# Patient Record
Sex: FEMALE | Race: WHITE | NOT HISPANIC OR LATINO | URBAN - METROPOLITAN AREA
[De-identification: names, ages, dates, MRNs, and addresses within clinical notes are randomized per-mention and may not be internally consistent; named-entity substitution may affect disease eponyms.]

---

## 2017-10-26 ENCOUNTER — IMPORTED ENCOUNTER (OUTPATIENT)
Dept: URBAN - METROPOLITAN AREA CLINIC 50 | Facility: CLINIC | Age: 72
End: 2017-10-26

## 2017-11-14 ENCOUNTER — IMPORTED ENCOUNTER (OUTPATIENT)
Dept: URBAN - METROPOLITAN AREA CLINIC 50 | Facility: CLINIC | Age: 72
End: 2017-11-14

## 2017-12-15 ENCOUNTER — IMPORTED ENCOUNTER (OUTPATIENT)
Dept: URBAN - METROPOLITAN AREA CLINIC 50 | Facility: CLINIC | Age: 72
End: 2017-12-15

## 2018-03-30 ENCOUNTER — IMPORTED ENCOUNTER (OUTPATIENT)
Dept: URBAN - METROPOLITAN AREA CLINIC 50 | Facility: CLINIC | Age: 73
End: 2018-03-30

## 2018-03-30 NOTE — PATIENT DISCUSSION
"""D/w patient using art tears more often and before the problem starts."" ""Start Artificial tears both eyes two - four times a day.  """

## 2019-04-05 ENCOUNTER — IMPORTED ENCOUNTER (OUTPATIENT)
Dept: URBAN - METROPOLITAN AREA CLINIC 50 | Facility: CLINIC | Age: 74
End: 2019-04-05

## 2020-06-12 ENCOUNTER — IMPORTED ENCOUNTER (OUTPATIENT)
Dept: URBAN - METROPOLITAN AREA CLINIC 50 | Facility: CLINIC | Age: 75
End: 2020-06-12

## 2020-06-12 NOTE — PATIENT DISCUSSION
"""Discussed dermatochalasis symptoms with patient. Discussed possible blepharoplasty if vision affected in the future. """

## 2020-06-12 NOTE — PATIENT DISCUSSION
"""Patient c/o she has to hold lids up to read."" ""Discussed dermatochalasis symptoms with patient. Discussed possible blepharoplasty if vision affected in the future. """

## 2020-06-26 ENCOUNTER — IMPORTED ENCOUNTER (OUTPATIENT)
Dept: URBAN - METROPOLITAN AREA CLINIC 50 | Facility: CLINIC | Age: 75
End: 2020-06-26

## 2021-04-18 ASSESSMENT — TONOMETRY
OS_IOP_MMHG: 16
OS_IOP_MMHG: 13
OS_IOP_MMHG: 14
OS_IOP_MMHG: 11
OD_IOP_MMHG: 15
OD_IOP_MMHG: 12
OD_IOP_MMHG: 10
OD_IOP_MMHG: 14
OD_IOP_MMHG: 15
OS_IOP_MMHG: 16

## 2021-04-18 ASSESSMENT — VISUAL ACUITY
OD_OTHER: >20/400.
OD_CC: J1+@ 16 IN
OD_CC: 20/20-2
OD_CC: 20/25
OS_BAT: 20/25
OD_OTHER: 20/30. 20/40.
OS_CC: 20/25
OD_BAT: >20/400
OS_CC: 20/30
OD_CC: 20/25
OS_CC: 20/20
OS_CC: 20/25
OS_OTHER: 20/50-. 20/80-.
OS_CC: J1+
OD_CC: J1+
OS_OTHER: 20/25. 20/30.
OS_CC: J1+@ 16 IN
OD_CC: J1@ 16 IN
OS_CC: J1@ 16 IN
OD_CC: 20/40+2
OD_BAT: 20/30
OS_CC: 20/25-
OD_CC: 20/30+1
OS_BAT: 20/50-

## 2021-06-16 ENCOUNTER — PREPPED CHART (OUTPATIENT)
Dept: URBAN - METROPOLITAN AREA CLINIC 52 | Facility: CLINIC | Age: 76
End: 2021-06-16

## 2021-06-18 ENCOUNTER — COMPREHENSIVE EXAM (OUTPATIENT)
Dept: URBAN - METROPOLITAN AREA CLINIC 52 | Facility: CLINIC | Age: 76
End: 2021-06-18

## 2021-06-18 DIAGNOSIS — H35.371: ICD-10-CM

## 2021-06-18 DIAGNOSIS — H25.13: ICD-10-CM

## 2021-06-18 DIAGNOSIS — H43.813: ICD-10-CM

## 2021-06-18 PROCEDURE — 92134 CPTRZ OPH DX IMG PST SGM RTA: CPT

## 2021-06-18 PROCEDURE — 92014 COMPRE OPH EXAM EST PT 1/>: CPT

## 2021-06-18 ASSESSMENT — VISUAL ACUITY
OS_GLARE: 20/80
OD_GLARE: 20/200
OD_CC: 20/30-1
OS_CC: 20/20-1
OD_GLARE: 20/50
OS_GLARE: 20/40
OU_CC: J1+

## 2021-06-18 ASSESSMENT — TONOMETRY
OD_IOP_MMHG: 14
OS_IOP_MMHG: 14

## 2022-06-17 ENCOUNTER — COMPREHENSIVE EXAM (OUTPATIENT)
Dept: URBAN - METROPOLITAN AREA CLINIC 52 | Facility: CLINIC | Age: 77
End: 2022-06-17

## 2022-06-17 DIAGNOSIS — H35.373: ICD-10-CM

## 2022-06-17 DIAGNOSIS — H25.13: ICD-10-CM

## 2022-06-17 DIAGNOSIS — H43.813: ICD-10-CM

## 2022-06-17 PROCEDURE — 92014 COMPRE OPH EXAM EST PT 1/>: CPT

## 2022-06-17 PROCEDURE — 92134 CPTRZ OPH DX IMG PST SGM RTA: CPT

## 2022-06-17 PROCEDURE — 92015 DETERMINE REFRACTIVE STATE: CPT

## 2022-06-17 ASSESSMENT — TONOMETRY
OS_IOP_MMHG: 15
OD_IOP_MMHG: 16

## 2022-06-17 ASSESSMENT — VISUAL ACUITY
OU_CC: J1+
OD_CC: 20/30-2
OS_GLARE: 20/40-2
OD_GLARE: 20/40
OS_GLARE: 20/40
OS_CC: 20/30
OD_GLARE: 20/60

## 2023-06-23 ENCOUNTER — COMPREHENSIVE EXAM (OUTPATIENT)
Dept: URBAN - METROPOLITAN AREA CLINIC 52 | Facility: CLINIC | Age: 78
End: 2023-06-23

## 2023-06-23 DIAGNOSIS — H43.813: ICD-10-CM

## 2023-06-23 DIAGNOSIS — H35.361: ICD-10-CM

## 2023-06-23 DIAGNOSIS — H35.373: ICD-10-CM

## 2023-06-23 DIAGNOSIS — H25.13: ICD-10-CM

## 2023-06-23 PROCEDURE — 92134 CPTRZ OPH DX IMG PST SGM RTA: CPT

## 2023-06-23 PROCEDURE — 92014 COMPRE OPH EXAM EST PT 1/>: CPT

## 2023-06-23 ASSESSMENT — VISUAL ACUITY
OS_GLARE: 20/40
OU_CC: J1+ @16IN
OD_GLARE: 20/40
OD_GLARE: 20/50
OS_GLARE: 20/50
OS_CC: 20/25-1
OD_CC: 20/25

## 2023-06-23 ASSESSMENT — TONOMETRY
OS_IOP_MMHG: 15
OD_IOP_MMHG: 15

## 2024-08-02 ENCOUNTER — COMPREHENSIVE EXAM (OUTPATIENT)
Dept: URBAN - METROPOLITAN AREA CLINIC 52 | Facility: CLINIC | Age: 79
End: 2024-08-02

## 2024-08-02 DIAGNOSIS — H25.13: ICD-10-CM

## 2024-08-02 DIAGNOSIS — H52.4: ICD-10-CM

## 2024-08-02 DIAGNOSIS — H35.373: ICD-10-CM

## 2024-08-02 DIAGNOSIS — H02.834: ICD-10-CM

## 2024-08-02 DIAGNOSIS — H35.361: ICD-10-CM

## 2024-08-02 DIAGNOSIS — H43.813: ICD-10-CM

## 2024-08-02 DIAGNOSIS — H02.831: ICD-10-CM

## 2024-08-02 PROCEDURE — 99214 OFFICE O/P EST MOD 30 MIN: CPT

## 2024-08-02 PROCEDURE — 92015 DETERMINE REFRACTIVE STATE: CPT

## 2024-08-02 PROCEDURE — 92134 CPTRZ OPH DX IMG PST SGM RTA: CPT

## 2024-08-02 ASSESSMENT — VISUAL ACUITY
OD_GLARE: 20/40+1
OS_GLARE: 20/20
OS_CC: 20/20-2
OD_GLARE: 20/30-2
OS_GLARE: 20/25
OD_CC: 20/30-2
OU_CC: J1+@14"

## 2024-08-02 ASSESSMENT — TONOMETRY
OS_IOP_MMHG: 15
OD_IOP_MMHG: 15

## 2024-09-05 ENCOUNTER — PRE-OP/H&P (OUTPATIENT)
Dept: URBAN - METROPOLITAN AREA CLINIC 52 | Facility: CLINIC | Age: 79
End: 2024-09-05

## 2024-09-05 DIAGNOSIS — H25.13: ICD-10-CM

## 2024-09-05 PROCEDURE — 92136 OPHTHALMIC BIOMETRY: CPT

## 2024-09-05 PROCEDURE — 99214 OFFICE O/P EST MOD 30 MIN: CPT

## 2024-09-05 PROCEDURE — 92025IOL CORNEAL TOPOGRAPHY PREMIUM IOL

## 2024-09-05 PROCEDURE — 92134 CPTRZ OPH DX IMG PST SGM RTA: CPT | Mod: NC

## 2024-09-11 ENCOUNTER — SURGERY/PROCEDURE (OUTPATIENT)
Dept: URBAN - METROPOLITAN AREA SURGERY 16 | Facility: SURGERY | Age: 79
End: 2024-09-11

## 2024-09-11 DIAGNOSIS — H25.13: ICD-10-CM

## 2024-09-11 PROCEDURE — 99199PCV CUSTOM VISION

## 2024-09-11 PROCEDURE — 66984CV REMOVE CATARACT, INSERT LENS, CUSTOM VISION

## 2024-09-12 ENCOUNTER — POST-OP (OUTPATIENT)
Dept: URBAN - METROPOLITAN AREA CLINIC 52 | Facility: CLINIC | Age: 79
End: 2024-09-12

## 2024-09-12 DIAGNOSIS — Z98.41: ICD-10-CM

## 2024-09-12 DIAGNOSIS — Z96.1: ICD-10-CM

## 2024-09-24 ENCOUNTER — POST-OP (OUTPATIENT)
Dept: URBAN - METROPOLITAN AREA CLINIC 50 | Facility: LOCATION | Age: 79
End: 2024-09-24

## 2024-09-24 DIAGNOSIS — H43.813: ICD-10-CM

## 2024-09-24 DIAGNOSIS — Z98.41: ICD-10-CM

## 2024-09-24 DIAGNOSIS — Z96.1: ICD-10-CM

## 2024-09-24 PROCEDURE — 92134 CPTRZ OPH DX IMG PST SGM RTA: CPT | Mod: NC

## 2024-10-02 ENCOUNTER — SURGERY/PROCEDURE (OUTPATIENT)
Dept: URBAN - METROPOLITAN AREA SURGERY 16 | Facility: SURGERY | Age: 79
End: 2024-10-02

## 2024-10-02 DIAGNOSIS — H25.12: ICD-10-CM

## 2024-10-02 PROCEDURE — 68841 INSJ RX ELUT IMPLT LAC CANAL: CPT | Mod: 51,LT,79,LT

## 2024-10-02 PROCEDURE — 99199PCV CUSTOM VISION

## 2024-10-02 PROCEDURE — 66984CV REMOVE CATARACT, INSERT LENS, CUSTOM VISION: Mod: 79,LT

## 2024-10-03 ENCOUNTER — POST-OP (OUTPATIENT)
Dept: URBAN - METROPOLITAN AREA CLINIC 52 | Facility: CLINIC | Age: 79
End: 2024-10-03

## 2024-10-03 DIAGNOSIS — Z96.1: ICD-10-CM

## 2024-10-03 DIAGNOSIS — Z98.42: ICD-10-CM

## 2024-10-03 PROCEDURE — 99024 POSTOP FOLLOW-UP VISIT: CPT

## 2024-10-31 ENCOUNTER — POST-OP (OUTPATIENT)
Dept: URBAN - METROPOLITAN AREA CLINIC 52 | Facility: CLINIC | Age: 79
End: 2024-10-31

## 2024-10-31 DIAGNOSIS — Z96.1: ICD-10-CM

## 2024-10-31 DIAGNOSIS — Z98.42: ICD-10-CM

## 2024-10-31 DIAGNOSIS — Z98.41: ICD-10-CM
